# Patient Record
Sex: FEMALE | URBAN - METROPOLITAN AREA
[De-identification: names, ages, dates, MRNs, and addresses within clinical notes are randomized per-mention and may not be internally consistent; named-entity substitution may affect disease eponyms.]

---

## 2022-07-13 ENCOUNTER — APPOINTMENT (RX ONLY)
Dept: URBAN - METROPOLITAN AREA CLINIC 45 | Facility: CLINIC | Age: 67
Setting detail: DERMATOLOGY
End: 2022-07-13

## 2022-07-13 DIAGNOSIS — D485 NEOPLASM OF UNCERTAIN BEHAVIOR OF SKIN: ICD-10-CM | Status: INADEQUATELY CONTROLLED

## 2022-07-13 PROBLEM — D48.5 NEOPLASM OF UNCERTAIN BEHAVIOR OF SKIN: Status: ACTIVE | Noted: 2022-07-13

## 2022-07-13 PROCEDURE — 11102 TANGNTL BX SKIN SINGLE LES: CPT

## 2022-07-13 PROCEDURE — ? PRESCRIPTION MEDICATION MANAGEMENT

## 2022-07-13 PROCEDURE — ? PRESCRIPTION

## 2022-07-13 PROCEDURE — ? BIOPSY BY SHAVE METHOD

## 2022-07-13 PROCEDURE — ? PHOTO-DOCUMENTATION

## 2022-07-13 PROCEDURE — ? COUNSELING

## 2022-07-13 RX ORDER — MUPIROCIN 20 MG/G
1 OINTMENT TOPICAL BID
Qty: 15 | Refills: 0 | COMMUNITY
Start: 2022-07-13

## 2022-07-13 RX ADMIN — MUPIROCIN 1: 20 OINTMENT TOPICAL at 00:00

## 2022-07-13 ASSESSMENT — LOCATION ZONE DERM
LOCATION ZONE: TRUNK
LOCATION ZONE: NOSE

## 2022-07-13 ASSESSMENT — LOCATION DETAILED DESCRIPTION DERM
LOCATION DETAILED: NASAL DORSUM
LOCATION DETAILED: EPIGASTRIC SKIN

## 2022-07-13 ASSESSMENT — LOCATION SIMPLE DESCRIPTION DERM
LOCATION SIMPLE: ABDOMEN
LOCATION SIMPLE: NOSE

## 2022-07-13 NOTE — PROCEDURE: PRESCRIPTION MEDICATION MANAGEMENT
Plan: Recommended to apply to lesion to prevent any possible infection as patient is diabetic. Pt hesitant to proceed with bx, discussed at length only way to identify etiology of lesion And appropriate treatment is by bx, pt daughter and pt agree to proceed with bx at this time\\n\\nProfessional translation offered and declined by pt, translation provided by Caryn Zhou CMA
Initiate Treatment: mupirocin 2 % topical ointment BID\\nSig: Apply 2 times a day to bx site for 14 days
Detail Level: Zone
Render In Strict Bullet Format?: No

## 2022-07-13 NOTE — PROCEDURE: BIOPSY BY SHAVE METHOD

## 2022-07-13 NOTE — HPI: SKIN LESIONS
Is This A New Presentation, Or A Follow-Up?: Skin Lesion
How Severe Is Your Skin Lesion?: moderate
Have Your Skin Lesions Been Treated?: been treated
Additional History: Patient went to [place] for treatment about 2 years ago and was told it could be skin cancer as it was a small wound that was not healing, and was treated with laser. After a year it had recur and was treated again, but in this last year it grew in size, and not healing.

## 2022-10-13 ENCOUNTER — APPOINTMENT (RX ONLY)
Dept: URBAN - METROPOLITAN AREA CLINIC 45 | Facility: CLINIC | Age: 67
Setting detail: DERMATOLOGY
End: 2022-10-13

## 2022-10-13 PROBLEM — C44.311 BASAL CELL CARCINOMA OF SKIN OF NOSE: Status: ACTIVE | Noted: 2022-10-13

## 2022-10-13 PROCEDURE — ? CONSULTATION FOR MOHS SURGERY

## 2022-10-13 PROCEDURE — 99213 OFFICE O/P EST LOW 20 MIN: CPT

## 2022-10-13 NOTE — PROCEDURE: MIPS QUALITY
Detail Level: Zone
Quality 130: Documentation Of Current Medications In The Medical Record: Current Medications Documented
Detail Level: Detailed
Quality 110: Preventive Care And Screening: Influenza Immunization: Influenza Immunization not Administered because Patient Refused.
Detail Level: Detailed
Detail Level: Generalized